# Patient Record
Sex: FEMALE | Race: WHITE | Employment: STUDENT | URBAN - METROPOLITAN AREA
[De-identification: names, ages, dates, MRNs, and addresses within clinical notes are randomized per-mention and may not be internally consistent; named-entity substitution may affect disease eponyms.]

---

## 2021-08-09 ENCOUNTER — HOSPITAL ENCOUNTER (OUTPATIENT)
Dept: PHYSICAL THERAPY | Age: 31
Discharge: HOME | End: 2021-08-09

## 2021-08-09 NOTE — PROGRESS NOTES
PT INITIAL EVALUATION NOTE 2-15    Patient Name: Violeta Larry  Date:2021  : 1990  [x]  Patient  Verified  Payor: BLUE CROSS / Plan: [facility] PPO / Product Type: PPO /    In time:***  Out time:***  Total Treatment Time (min): ***  Visit #: ***     Treatment Area: Pain in right hip [M25.551]    SUBJECTIVE  Pain Level (0-10 scale): ***  Any medication changes, allergies to medications, adverse drug reactions, diagnosis change, or new procedure performed?: [] No    [x] Yes (see summary sheet for update)  Subjective:     ***  PLOF: ***  Mechanism of Injury: ***  Previous Treatment/Compliance: ***  PMHx/Surgical Hx: ***  Work Hx: ***  Living Situation: ***  Pt Goals: ***  Barriers: ***  Motivation: ***  Substance use: ***   Cognition: A & O x ***        OBJECTIVE/EXAMINATION  ***    Modality rationale: {BSHSI INMOTION MODALITIES:62609} to improve the patients ability to ***   Min Type Additional Details    [] Estim: []Att   []Unatt        []TENS instruct                  []IFC  []Premod   []NMES                     []Other:  []w/US   []w/ice   []w/heat  Position:  Location:    []  Traction: [] Cervical       []Lumbar                       [] Prone          []Supine                       []Intermittent   []Continuous Lbs:  [] before manual  [] after manual  []w/heat    []  Ultrasound: []Continuous   [] Pulsed at:                            []1MHz   []3MHz Location:  W/cm2:    []  Paraffin         Location:  []w/heat    []  Ice     []  Heat  []  Ice massage Position:  Location:    []  Laser  []  Other: Position:  Location:    []  Vasopneumatic Device Pressure:       [] lo [] med [] hi   Temperature:    [x] Skin assessment post-treatment:  [x]intact []redness- no adverse reaction    []redness  adverse reaction:     *** min Therapeutic Exercise:  [] See flow sheet :   Rationale: {BSHSI IMMOTION THER EX:55636} to improve the patients ability to ***    *** min Therapeutic Activity:  []  See flow sheet :   Rationale: {BSHSI IMMOTION THER EX:87541}  to improve the patients ability to ***     *** min Neuromuscular Re-education:  []  See flow sheet :   Rationale: {BSHSI IMMOTION THER EX:68680}  to improve the patients ability to ***    *** min Self Care/Home Management:  []  See flow sheet :   Rationale: {BSHSI IMMOTION THER EX:96657}  to improve the patients ability to ***    *** min Manual Therapy:  ***   Rationale: {BSHSI IMMOTION MANUAL THERAPY:66660}  to improve the patients ability to ***    *** min Gait Training:  ___ feet with ___ device on level surfaces with ___ level of assist   Rationale: {BSHSI IMMOTION THER EX:73402}  to improve the patients ability to ***          With   [] TE   [] TA   [] Neuro   [] SC   [] other: Patient Education: [x] Review HEP    [] Progressed/Changed HEP based on:   [] positioning   [] body mechanics   [] transfers   [] heat/ice application    [] other:        Other Objective/Functional Measures: FOTO Functional Measure: ***/100              ***    Pain Level (0-10 scale) post treatment: ***      ASSESSMENT:      [x]  See Plan of Chela Urbano, PT 8/9/2021

## 2021-08-23 ENCOUNTER — HOSPITAL ENCOUNTER (OUTPATIENT)
Dept: PHYSICAL THERAPY | Age: 31
Discharge: HOME | End: 2021-08-23
Payer: COMMERCIAL

## 2021-08-23 PROCEDURE — 97162 PT EVAL MOD COMPLEX 30 MIN: CPT | Performed by: PHYSICAL THERAPIST

## 2021-08-23 PROCEDURE — 97112 NEUROMUSCULAR REEDUCATION: CPT | Performed by: PHYSICAL THERAPIST

## 2021-08-23 NOTE — PROGRESS NOTES
PT INITIAL EVALUATION NOTE - [facility] 2-15    Patient Name: Susy Sevilla  Date:2021  : 1990  [x]  Patient  Verified  Payor: BLUE CROSS / Plan: Cec[facility] [id number] PPO / Product Type: PPO /    In time: 1:20 PM  Out time: 230 PM  Total Treatment Time (min): 70  Total Timed Codes (min): 15  1:1 Treatment Time ( only): 15   Visit #: 1     Treatment Area: Pain in right hip [M25.551]    SUBJECTIVE  Pain Level (0-10 scale): 6/10 \"I golfed 3 times this week\"   Normally its a -3/10  Any medication changes, allergies to medications, adverse drug reactions, diagnosis change, or new procedure performed?: [] No    [x] Yes (see summary sheet for update)  Subjective:    Pt reports history of scoliosis, CA and celiac disease. Pt has had back pain since [employer]. Pt was seeing a chiropractor for back pain and she recommended PT for her pelvic floor. She had 3 sessions and found out the R side of her pelvic floor was tight. Pain is increased by painting, overhead activities, weight lifting, sitting for a long time >10 minutes  Pt c/o HA pain. Pain is increased with stress and decreased with caffeine. \"I take Ibuprofen for pain as needed\"  \"My feet burn when I run\" Pt runs in [address] sleeps on her side    PLOF: Pt enjoys golfing, she enjoys walking her 2 dogs, painting her house, biking  Mechanism of Injury: chronic condition  Previous Treatment/Compliance: Pt sees the chiropractor ever 3 weeks [facility] [employer]   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 (M)     30 degrees  40 degrees  FA ER R.O.M.  (M)     70 degrees  40 degrees    Sidelying  Adduction Drop Test (M)    +   -  Pelvic [place] Drop Test (PADT) (P)  +   -  Passive Abduction Raise Test (PART) (P)  -   +  Passive IP ER Expansion Test (P)   limited   unlimited  Hruska Adduction Lift Test (M)   L0   L1  Hruska Abduction Lift Test (M)   NT   NT    Supine  Trunk Rotation (M)     75% inches  75% inches  Straight Leg Raise (M)    70 degrees  70 degrees   Apical Expansion (R)     good   limited*  Shoulder Flexion (R)     180 degrees  180 degrees  HG IR (R)      90 degrees  90 degrees  Subclavius Flexibility (R)    unlimited  unlimited  Elevated and Externally Rotated Ant.  Ribs (R) yes   no  Horizontal Abduction (R)    60 degrees  50 degrees  Extension Drop Test (M)    -   -    CERVICAL-CRANIO-MANDIBULAR  Cervical Axial Rotation    WNL degrees  WNL degrees  Cervical Side Bend     WNL   WNL  Cervical Extension     WNL        Modality rationale: decrease pain and increase tissue extensibility to improve the patients ability to sit, stand, transfer, ambulate, lift, carry, reach, complete ADLs   Min Type Additional Details    [] Estim: []Att   []Unatt        []TENS instruct                  []IFC  []Premod   []NMES                     []Other:  []w/US   []w/ice   []w/heat  Position:  Location:    []  Traction: [] Cervical       []Lumbar                       [] Prone          []Supine                       []Intermittent   []Continuous Lbs:  [] before manual  [] after manual  []w/heat    []  Ultrasound: []Continuous   [] Pulsed at:                           []1MHz   []3MHz Location:  W/cm2:    [] Paraffin         Location:   []w/heat   15 []  Ice     [x]  Heat  []  Ice massage Position: supine  Location: neck and back    []  Laser  []  Other: Position:  Location:      []  Vasopneumatic Device Pressure:       [] lo [] med [] hi   Temperature:      [x] Skin assessment post-treatment:  [x]intact []redness- no adverse reaction    []redness - adverse reaction:     15 min Neuromuscular Re-education:  [x]  See flow sheet :   Rationale: improve coordination, improve balance and increase proprioception  to improve the patients ability to sit, stand, transfer, ambulate, lift, carry, reach, complete ADLs    5 min Self Care/Home Management:  [x]  See flow sheet :   Rationale: improve coordination, improve balance and increase proprioception  to improve the patients ability to sit, stand, transfer, ambulate, lift, carry, reach, complete ADLs         With   [] TE   [] TA   [x] Neuro   [] SC   [] other: Patient Education: [x] Review HEP    [] Progressed/Changed HEP based on:   [x] positioning   [x] body mechanics   [] transfers   [x] heat/ice application    [] other:      Other Objective/Functional Measures: FOTO Functional Measure: 83/100              slight  Unable to lift L knee for S/L knee tow knee so S/L R glute max performed instead with max difficulty  After intervention HAdDT - B, PADT +L (use pelvis paradigm)        Pain Level (0-10 scale) post treatment: 0    ASSESSMENT/Changes in Function:     [x]  See Plan of [address] [place] [address], PT 8/23/2021

## 2021-08-23 NOTE — PROGRESS NOTES
Physical Therapy at [facility],   a part of  [facility]  [address] [facility]  Phone: [phone]  Fax: [phone]    Plan of Care/ Statement of Necessity for Physical Therapy Services 2-15    Patient name: Roxanne Tobias  : 1990  Provider#: [id number]  Referral source: Javid Quinteros*      Medical/Treatment Diagnosis: Pain in right hip [M25.551]     Prior Hospitalization: see medical history     Comorbidities: ALL (Leukemia) at 12 yo, Scoliosis, alcohol use, Kidney stones, Ovarian Torsion 2020, Celiac Disease, C- DIFF  Prior Level of Function: Pt enjoys golfing, she enjoys walking her 2 dogs, painting her house, biking  Medications: Verified on Patient Summary List    Start of Care: 23      Onset Date: >10 years       The Plan of Care and following information is based on the information from the initial evaluation. Assessment/ key information: The patient presents with patho L AIC, R BC patterning per [facility] treatment approach, contributing to back pain and HA which has been present for > 10 years. The patient's complex PMH noted above contributes to dysfunction. The patient would benefit from outpatient PT to address L anteriorly rotated innominate, limited trunk rotation, limited lumbar flexion, restricted SLR B, poor L pelvic floor ascension, poor R pelvic floor decension and improve breathing mechanics to reduce compensatory muscle recruitment and reduced pain. Evaluation Complexity History HIGH Complexity :3+ comorbidities / personal factors will impact the outcome/ POC ; Examination HIGH Complexity : 4+ Standardized tests and measures addressing body structure, function, activity limitation and / or participation in recreation  ;Presentation HIGH Complexity : Unstable and unpredictable characteristics  ; Clinical Decision Making MEDIUM Complexity : FOTO score of 26-74  Overall Complexity Rating: MEDIUM    Problem List: pain affecting function, decrease ROM, decrease strength, edema affecting function, impaired gait/ balance, decrease ADL/ functional abilitiies, decrease activity tolerance, decrease flexibility/ joint mobility and decrease transfer abilities   Treatment Plan may include any combination of the following: Therapeutic exercise, Therapeutic activities, Neuromuscular re-education, Physical agent/modality, Gait/balance training, Manual therapy, Patient education, Self Care training, Functional mobility training, Home safety training and Stair training  Patient / Family readiness to learn indicated by: asking questions, trying to perform skills and interest  Persons(s) to be included in education: patient (P)  Barriers to Learning/Limitations: None  Patient Goal (s): Pain free/ no headaches  Patient Self Reported Health Status: good  Rehabilitation Potential: excellent    Short Term Goals: To be accomplished in 4 weeks:  1) The patient will demonstrate independence with HEP  2) The patient will demonstrate Negative Hruska Adduction Drop Test   3) The patient will demonstrate greater than Grade II on Hruska Adduction Lift Test  Long Term Goals: To be accomplished in 12 weeks:  1) The patient will demonstrate Grade IV or Grade V Hruska Adduction Lift Score to allow for functional mobility in home and community settings  2) The patient will demonstrate the ability to ambulate forward and backward achieving bilateral Acetabular Femoral Internal Rotation for pain free mobility  3) The patient will demonstrate the ability to jog 1 mile without subjective complaints or gait deviation  4) The patient will demonstrate independence with discharge HEP to allow for ambulation, sitting and standing without objective dysfunction  Frequency / Duration: Patient to be seen 1 times per week for 12 weeks.     Patient/ Caregiver education and instruction: self care, activity modification and exercises    [x]  Plan of care has been reviewed with NAVYA Tao, PT 8/23/2021     ________________________________________________________________________    I certify that the above Therapy Services are being furnished while the patient is under my care. I agree with the treatment plan and certify that this therapy is necessary.     Physician's Signature:____________________  Date:____________Time: _________      SHAI Pelletier

## 2021-09-01 ENCOUNTER — APPOINTMENT (OUTPATIENT)
Dept: PHYSICAL THERAPY | Age: 31
End: 2021-09-01
Payer: COMMERCIAL

## 2021-09-01 ENCOUNTER — HOSPITAL ENCOUNTER (OUTPATIENT)
Dept: PHYSICAL THERAPY | Age: 31
Discharge: HOME | End: 2021-09-01
Payer: COMMERCIAL

## 2021-09-01 PROCEDURE — 97112 NEUROMUSCULAR REEDUCATION: CPT | Performed by: PHYSICAL MEDICINE & REHABILITATION

## 2021-09-01 NOTE — PROGRESS NOTES
PT DAILY TREATMENT NOTE - [facility] 2-15    Patient Name: Ledy Bernal  Date:2021  : 1990  [x]  Patient  Verified  Payor: BLUE CROSS / Plan: [facility] PPO / Product Type: PPO /    In time:730a  Out time:815a  Total Treatment Time (min): 45  Total Timed Codes (min): 45  1:1 Treatment Time ( only): 39   Visit #: 2      Treatment Area: Pain in right hip [M25.551]    SUBJECTIVE  Pain Level (0-10 scale): 3/10  Any medication changes, allergies to medications, adverse drug reactions, diagnosis change, or new procedure performed?: [x] No    [] Yes (see summary sheet for update)  Subjective functional status/changes:   [] No changes reported  Patient reported most of the pain today is in the low back. Patient stated very little HA this morning. OBJECTIVE       45 min Neuromuscular Re-education:  [x]  See flow sheet :   Rationale: increase ROM, increase strength, improve coordination, improve balance and increase proprioception  to improve the patients ability to ADLs, standing, work and walking tolerance          With   [x] TE   [] TA   [] neuro   [] other: Patient Education: [x] Review HEP    [] Progressed/Changed HEP based on:   [] positioning   [] body mechanics   [] transfers   [] heat/ice application    [] other:      Other Objective/Functional Measures:   HAdDT: + B  PADT: + B  HGIR:- B  Horizontal Abd: NT  HAdLT: 1/5 pre 3/5 post  Functional Squat: NT    Neutral following 90/90 martin  Positive following L SL knee toward knee (able to lift knee this time)  Poor ZOA and post mediastinum expansion.         Pain Level (0-10 scale) post treatment: 0/10    ASSESSMENT/Changes in Function:     Patient will continue to benefit from skilled PT services to modify and progress therapeutic interventions, address functional mobility deficits, address ROM deficits, address strength deficits, analyze and address soft tissue restrictions, analyze and cue movement patterns, analyze and modify body mechanics/ergonomics and assess and modify postural abnormalities to attain remaining goals. []  See Plan of Care  []  See progress note/recertification  []  See Discharge Summary         Progress towards goals / Updated goals:  Patient is progressing well towards goals with good tolerance of today's intervention.      PLAN  [x]  Upgrade activities as tolerated     [x]  Continue plan of care  [x]  Update interventions per flow sheet       []  Discharge due to:_  []  Other:_      Cassia House PTA, OPTA, CPT  9/1/2021

## 2021-09-07 ENCOUNTER — HOSPITAL ENCOUNTER (OUTPATIENT)
Dept: PHYSICAL THERAPY | Age: 31
Discharge: HOME | End: 2021-09-07
Payer: COMMERCIAL

## 2021-09-07 PROCEDURE — 97112 NEUROMUSCULAR REEDUCATION: CPT | Performed by: PHYSICAL MEDICINE & REHABILITATION

## 2021-09-07 NOTE — PROGRESS NOTES
PT DAILY TREATMENT NOTE - [facility] 2-15    Patient Name: Ledy Bernal  Date:2021  : 1990  [x]  Patient  Verified  Payor: BLUE CROSS / Plan: [facility] PPO / Product Type: PPO /    In time:850a  Out time:935a  Total Treatment Time (min): 45  Total Timed Codes (min): 45  1:1 Treatment Time ( only): 39   Visit #: 3      Treatment Area: Pain in right hip [M25.551]    SUBJECTIVE  Pain Level (0-10 scale): 0/10  Any medication changes, allergies to medications, adverse drug reactions, diagnosis change, or new procedure performed?: [x] No    [] Yes (see summary sheet for update)  Subjective functional status/changes:   [] No changes reported  Patient reported being inconsistent with her HEP due to the holiday weekend. Patient stated the hip and back are feeling better and the HA are the same. OBJECTIVE  45 min Neuromuscular Re-education:  [x]  See flow sheet :   Rationale: increase ROM, increase strength, improve coordination, improve balance and increase proprioception  to improve the patients ability to ADLs, standing, work and walking tolerance          With   [x] TE   [] TA   [] neuro   [] other: Patient Education: [x] Review HEP    [] Progressed/Changed HEP based on:   [] positioning   [] body mechanics   [] transfers   [] heat/ice application    [] other:      Other Objective/Functional Measures:   HAdDT: - B  PADT: - B  HGIR:- B  Horizontal Abd: NT  HAdLT: 3/5 Pre  Functional Squat: 2/5    Maintain neutral throughout today's visit. Poor ZOA and post mediastinum expansion.         Pain Level (0-10 scale) post treatment: 0/10    ASSESSMENT/Changes in Function:     Patient will continue to benefit from skilled PT services to modify and progress therapeutic interventions, address functional mobility deficits, address ROM deficits, address strength deficits, analyze and address soft tissue restrictions, analyze and cue movement patterns, analyze and modify body mechanics/ergonomics and assess and modify postural abnormalities to attain remaining goals. []  See Plan of Care  []  See progress note/recertification  []  See Discharge Summary         Progress towards goals / Updated goals:  Patient is progressing well towards goals with good tolerance of today's intervention.      PLAN  [x]  Upgrade activities as tolerated     [x]  Continue plan of care  [x]  Update interventions per flow sheet       []  Discharge due to:_  []  Other:_      Judie Barrientos PTA, OPTA, CPT  9/7/2021

## 2021-09-08 ENCOUNTER — APPOINTMENT (OUTPATIENT)
Dept: PHYSICAL THERAPY | Age: 31
End: 2021-09-08
Payer: COMMERCIAL

## 2021-09-14 ENCOUNTER — HOSPITAL ENCOUNTER (OUTPATIENT)
Dept: PHYSICAL THERAPY | Age: 31
Discharge: HOME | End: 2021-09-14
Payer: COMMERCIAL

## 2021-09-14 PROCEDURE — 97112 NEUROMUSCULAR REEDUCATION: CPT | Performed by: PHYSICAL THERAPIST

## 2021-09-14 NOTE — PROGRESS NOTES
PT DAILY TREATMENT NOTE - [facility] 2-15    Patient Name: Dorenda Severs  Date:2021  : 1990  [x]  Patient  Verified  Payor: BLUE CROSS / Plan: Cec[facility] [id number] PPO / Product Type: PPO /    In time: 4:20 PM  Out time:500  Total Treatment Time (min): 40  Total Timed Codes (min): 40  1:1 Treatment Time ( only): 40  Visit #: 4      Treatment Area: Pain in right hip [M25.551]    SUBJECTIVE  Pain Level (0-10 scale): 0/10  Any medication changes, allergies to medications, adverse drug reactions, diagnosis change, or new procedure performed?: [x] No    [] Yes (see summary sheet for update)  Subjective functional status/changes:   [] No changes reported  Patient reports her neck is much better and more mobile but her headaches are worse  Pt reports she got new shoes for running and she is trying to wear tennis shoes more often   Pt reports she felt better running  Pt reports compliance with supine amrtin-bridge but none of the other exercises    OBJECTIVE  40 min Neuromuscular Re-education:  [x]  See flow sheet :   Rationale: increase ROM, increase strength, improve coordination, improve balance and increase proprioception  to improve the patients ability to ADLs, standing, work and walking tolerance          With   [x] TE   [] TA   [] neuro   [] other: Patient Education: [x] Review HEP    [] Progressed/Changed HEP based on:   [] positioning   [] body mechanics   [] transfers   [] heat/ice application    [] other:      Other Objective/Functional Measures:   HAdDT: - B  PADT: - B  HGIR:- B   Horizontal Abd: R 45 L 30  HAdLT: NT  Functional Squat: NT    Pt senses L canine when approximating teeth, discussed use of night splint for reducing headaches       Pain Level (0-10 scale) post treatment: 0/10    ASSESSMENT/Changes in Function:     Patient will continue to benefit from skilled PT services to modify and progress therapeutic interventions, address functional mobility deficits, address ROM deficits, address strength deficits, analyze and address soft tissue restrictions, analyze and cue movement patterns, analyze and modify body mechanics/ergonomics and assess and modify postural abnormalities to attain remaining goals. []  See Plan of Care  []  See progress note/recertification  []  See Discharge Summary         Progress towards goals / Updated goals:  Excellent progress towards goals.     PLAN  [x]  Upgrade activities as tolerated     [x]  Continue plan of care  [x]  Update interventions per flow sheet       []  Discharge due to:_  []  Other:_      Faith Wilkerson, PT  DPT 9/14/2021

## 2021-09-20 ENCOUNTER — APPOINTMENT (OUTPATIENT)
Dept: PHYSICAL THERAPY | Age: 31
End: 2021-09-20
Payer: COMMERCIAL

## 2021-09-22 ENCOUNTER — HOSPITAL ENCOUNTER (OUTPATIENT)
Dept: PHYSICAL THERAPY | Age: 31
Discharge: HOME | End: 2021-09-22
Payer: COMMERCIAL

## 2021-09-22 PROCEDURE — 97112 NEUROMUSCULAR REEDUCATION: CPT | Performed by: PHYSICAL MEDICINE & REHABILITATION

## 2021-09-22 NOTE — PROGRESS NOTES
PT DAILY TREATMENT NOTE - [facility] 2-15    Patient Name: Briana Villafana  Date:2021  : 1990  [x]  Patient  Verified  Payor: BLUE ARNALDO / Plan: Cecily [facility] [id number] PPO / Product Type: PPO /    In time: 700 AM  Out time:745 AM  Total Treatment Time (min): 45  Total Timed Codes (min): 45  1:1 Treatment Time ( only): 45  Visit #: 5      Treatment Area: Pain in right hip [M25.551]    SUBJECTIVE  Pain Level (0-10 scale): 0/10  Any medication changes, allergies to medications, adverse drug reactions, diagnosis change, or new procedure performed?: [x] No    [] Yes (see summary sheet for update)  Subjective functional status/changes:   [] No changes reported  Patient reported she did her HEP everyday since she was on vacation. OBJECTIVE  45 min Neuromuscular Re-education:  [x]  See flow sheet :   Rationale: increase ROM, increase strength, improve coordination, improve balance and increase proprioception  to improve the patients ability to ADLs, standing, work and walking tolerance          With   [x] TE   [] TA   [] neuro   [] other: Patient Education: [x] Review HEP    [] Progressed/Changed HEP based on:   [] positioning   [] body mechanics   [] transfers   [] heat/ice application    [] other:      Other Objective/Functional Measures:   HAdDT: - B  PADT: - B  HGIR:- B   Horizontal Abd: R 50 L 50  HAdLT: L 3/5 R 1/5 all 3/5 post  Functional Squat: 2/5 pre 2+/5 post       Pain Level (0-10 scale) post treatment: 0/10    ASSESSMENT/Changes in Function:     Patient will continue to benefit from skilled PT services to modify and progress therapeutic interventions, address functional mobility deficits, address ROM deficits, address strength deficits, analyze and address soft tissue restrictions, analyze and cue movement patterns, analyze and modify body mechanics/ergonomics and assess and modify postural abnormalities to attain remaining goals.      []  See Plan of Care  []  See progress note/recertification  []  See Discharge Summary         Progress towards goals / Updated goals:  Progress note next visit.     PLAN  [x]  Upgrade activities as tolerated     [x]  Continue plan of care  [x]  Update interventions per flow sheet       []  Discharge due to:_  []  Other:_      Génesis Courts  PTA, OPTA, CPT  9/22/2021

## 2021-09-29 ENCOUNTER — APPOINTMENT (OUTPATIENT)
Dept: PHYSICAL THERAPY | Age: 31
End: 2021-09-29
Payer: COMMERCIAL

## 2021-10-06 ENCOUNTER — HOSPITAL ENCOUNTER (OUTPATIENT)
Dept: PHYSICAL THERAPY | Age: 31
Discharge: HOME | End: 2021-10-06
Payer: COMMERCIAL

## 2021-10-06 PROCEDURE — 97112 NEUROMUSCULAR REEDUCATION: CPT | Performed by: PHYSICAL THERAPIST

## 2021-10-06 NOTE — PROGRESS NOTES
PT DAILY TREATMENT NOTE 2-15    Patient Name: Ravi Reyna  Date:10/6/2021  : 1990  [x]  Patient  Verified  Payor: BLUE CROSS / Plan: [facility] PPO / Product Type: PPO /    In time:7:10AM  Out time:7:50AM  Total Treatment Time (min): 40  Visit #: 6    Treatment Area: Pain in right hip [M25.551]    SUBJECTIVE  Pain Level (0-10 scale): 0.5/10  Any medication changes, allergies to medications, adverse drug reactions, diagnosis change, or new procedure performed?: [x] No    [] Yes (see summary sheet for update)  Subjective functional status/changes:   [x] No changes reported  Pt reports poor compliance with HEP due to busy schedule    OBJECTIVE      40 min Neuromuscular Re-education:  [x]  See flow sheet :   Rationale: improve coordination, improve balance and increase proprioception  to improve the patients ability to sit, stand, transfer, ambulate, lift, carry, reach, complete ADLs        With   [] TE   [] TA   [x] Neuro   [] SC   [] other: Patient Education: [x] Review HEP    [] Progressed/Changed HEP based on:   [x] positioning   [x] body mechanics   [] transfers   [x] heat/ice application    [] other:      Other Objective/Functional Measures:   HAdDT - B  Lift test R L3 L L4 (heel off table in start position, R worse than L)    HG IR + R (- after intervention)  horiz abd WNL     Limited FAER    Pain Level (0-10 scale) post treatment: 1/10    ASSESSMENT/Changes in Function:      Patient will continue to benefit from skilled PT services to modify and progress therapeutic interventions, address functional mobility deficits, address ROM deficits, address strength deficits, analyze and address soft tissue restrictions, analyze and cue movement patterns, analyze and modify body mechanics/ergonomics, assess and modify postural abnormalities and instruct in home and community integration to attain remaining goals.      []  See Plan of Care  []  See progress note/recertification  []  See Discharge Summary         Progress towards goals / Updated goals: Addressed limited R FA ER with stretching and retro stairs for improved ability to maintain neutral pelvis.      PLAN  [x]  Upgrade activities as tolerated     [x]  Continue plan of care  [x]  Update interventions per flow sheet       []  Discharge due to:_  []  Other:_      Vivi Basurto, PT 10/6/2021

## 2021-10-13 ENCOUNTER — HOSPITAL ENCOUNTER (OUTPATIENT)
Dept: PHYSICAL THERAPY | Age: 31
Discharge: HOME | End: 2021-10-13
Payer: COMMERCIAL

## 2021-10-13 PROCEDURE — 97112 NEUROMUSCULAR REEDUCATION: CPT | Performed by: PHYSICAL THERAPIST

## 2021-10-13 NOTE — PROGRESS NOTES
PT DAILY TREATMENT NOTE 2-15    Patient Name: Quirino Arreaga  Date:10/13/2021  : 1990  [x]  Patient  Verified  Payor: BLUE CROSS / Plan: [id number] PPO / Product Type: PPO /    In time:7:00 AM  Out time: 745 Am   Total Treatment Time (min): 45  Visit #: 7    Treatment Area: Pain in right hip [M25.551]    SUBJECTIVE  Pain Level (0-10 scale): 0/10  Any medication changes, allergies to medications, adverse drug reactions, diagnosis change, or new procedure performed?: [x] No    [] Yes (see summary sheet for update)  Subjective functional status/changes:   [x] No changes reported  Pt reports she had her best run yet last week     OBJECTIVE      45 min Neuromuscular Re-education:  [x]  See flow sheet :   Rationale: improve coordination, improve balance and increase proprioception  to improve the patients ability to sit, stand, transfer, ambulate, lift, carry, reach, complete ADLs        With   [] TE   [] TA   [x] Neuro   [] SC   [] other: Patient Education: [x] Review HEP    [] Progressed/Changed HEP based on:   [x] positioning   [x] body mechanics   [] transfers   [x] heat/ice application    [] other:      Other Objective/Functional Measures:   HAdDT - B  Lift test R L0 L L3 (R L2 after intervention)    HG IR - B  horiz abd 20 deg B (50 deg after 90/90 IO/TA exercise)     L 2 squat (L3 squat after retro stairs)    Pain Level (0-10 scale) post treatment: 0/10    ASSESSMENT/Changes in Function:      Patient will continue to benefit from skilled PT services to modify and progress therapeutic interventions, address functional mobility deficits, address ROM deficits, address strength deficits, analyze and address soft tissue restrictions, analyze and cue movement patterns, analyze and modify body mechanics/ergonomics, assess and modify postural abnormalities and instruct in home and community integration to attain remaining goals.      []  See Plan of Care  []  See progress note/recertification  [] See Discharge Summary         Progress towards goals / Updated goals: Max fatigue with L quad during 90/90 IO/TA. Added seated balloon breathing without significant compensation. Max fatigue with squats.     PLAN  [x]  Upgrade activities as tolerated     [x]  Continue plan of care  [x]  Update interventions per flow sheet       []  Discharge due to:_  []  Other:_      Benita Andersen, PT 10/13/2021

## 2021-10-20 ENCOUNTER — HOSPITAL ENCOUNTER (OUTPATIENT)
Dept: PHYSICAL THERAPY | Age: 31
Discharge: HOME | End: 2021-10-20
Payer: COMMERCIAL

## 2021-10-20 PROCEDURE — 97112 NEUROMUSCULAR REEDUCATION: CPT | Performed by: PHYSICAL MEDICINE & REHABILITATION

## 2021-10-20 NOTE — PROGRESS NOTES
PT DAILY TREATMENT NOTE 2-15    Patient Name: Luis Manuel Tarango  Date:10/20/2021  : 1990  [x]  Patient  Verified  Payor: BLUE CROSS / Plan: [facility] PPO / Product Type: PPO /    In time:7:00 AM  Out time: 740 am   Total Treatment Time (min): 40  Visit #: 8    Treatment Area: Pain in right hip [M25.551]    SUBJECTIVE  Pain Level (0-10 scale): 0/10  Any medication changes, allergies to medications, adverse drug reactions, diagnosis change, or new procedure performed?: [x] No    [] Yes (see summary sheet for update)  Subjective functional status/changes:   [x] No changes reported  Pt reports she overall has been feeling good. Mod difficulty with HEP. OBJECTIVE    40 min Neuromuscular Re-education:  [x]  See flow sheet :   Rationale: improve coordination, improve balance and increase proprioception  to improve the patients ability to sit, stand, transfer, ambulate, lift, carry, reach, complete ADLs        With   [] TE   [] TA   [x] Neuro   [] SC   [] other: Patient Education: [x] Review HEP    [] Progressed/Changed HEP based on:   [x] positioning   [x] body mechanics   [] transfers   [x] heat/ice application    [] other:      Other Objective/Functional Measures:   HAdDT - B  Lift test Pre R L3 L L3 Post Still level 3 but easier to achieve. HG IR - B  horiz abd 45 deg L 60 on R (50 deg after 90/90 IO/TA exercise)     L 3 squat  Continued difficulty with maintaining pelvic tilt while squatting. Mod to max difficulty with standing sup B IO/TA.     Pain Level (0-10 scale) post treatment: 0/10    ASSESSMENT/Changes in Function:      Patient will continue to benefit from skilled PT services to modify and progress therapeutic interventions, address functional mobility deficits, address ROM deficits, address strength deficits, analyze and address soft tissue restrictions, analyze and cue movement patterns, analyze and modify body mechanics/ergonomics, assess and modify postural abnormalities and instruct in home and community integration to attain remaining goals. []  See Plan of Care  []  See progress note/recertification  []  See Discharge Summary         Progress towards goals / Updated goals: Max fatigue with L quad during 90/90 IO/TA. Max fatigue with squats.     PLAN  [x]  Upgrade activities as tolerated     [x]  Continue plan of care  [x]  Update interventions per flow sheet       []  Discharge due to:_  []  Other:_      Zeferino Case PTA, OPTA, CPT  10/20/2021

## 2021-10-26 ENCOUNTER — APPOINTMENT (OUTPATIENT)
Dept: PHYSICAL THERAPY | Age: 31
End: 2021-10-26
Payer: COMMERCIAL

## 2021-10-27 ENCOUNTER — APPOINTMENT (OUTPATIENT)
Dept: PHYSICAL THERAPY | Age: 31
End: 2021-10-27
Payer: COMMERCIAL

## 2021-11-03 ENCOUNTER — HOSPITAL ENCOUNTER (OUTPATIENT)
Dept: PHYSICAL THERAPY | Age: 31
Discharge: HOME | End: 2021-11-03
Payer: COMMERCIAL

## 2021-11-03 PROCEDURE — 97112 NEUROMUSCULAR REEDUCATION: CPT | Performed by: PHYSICAL THERAPIST

## 2021-11-03 NOTE — PROGRESS NOTES
PT DAILY TREATMENT NOTE 2-15    Patient Name: Elliot Ramos  Date:11/3/2021  : 1990  [x]  Patient  Verified  Payor: BLUE CROSS / Plan: [facility] PPO / Product Type: PPO /    In time: 215 PM Out time: 300 PM  Total Treatment Time (min): 45  Visit #: 9    Treatment Area: Pain in right hip [M25.551]    SUBJECTIVE  Pain Level (0-10 scale): 0/10  Any medication changes, allergies to medications, adverse drug reactions, diagnosis change, or new procedure performed?: [x] No    [] Yes (see summary sheet for update)  Subjective functional status/changes:   [x] No changes reported  Pt reports poor compliance with HEP and presents with flip flops donned  Pt c/o intermittent R ITB pain    OBJECTIVE    45 min Neuromuscular Re-education:  [x]  See flow sheet :   Rationale: improve coordination, improve balance and increase proprioception  to improve the patients ability to sit, stand, transfer, ambulate, lift, carry, reach, complete ADLs        With   [] TE   [] TA   [x] Neuro   [] SC   [] other: Patient Education: [x] Review HEP    [] Progressed/Changed HEP based on:   [x] positioning   [x] body mechanics   [] transfers   [x] heat/ice application    [] other:      Other Objective/Functional Measures:   HAdDT - B  Lift test Pre R L3 L L3 Post Still level 3 but improved adductor activation    HG IR - B  horiz abd 50 deg B      Squat NT, max verbal cues to ascend out of squatting bar reach with proper form  Mod to max difficulty with supine sup B IO/TA.     Pain Level (0-10 scale) post treatment: 0/10    ASSESSMENT/Changes in Function:      Patient will continue to benefit from skilled PT services to modify and progress therapeutic interventions, address functional mobility deficits, address ROM deficits, address strength deficits, analyze and address soft tissue restrictions, analyze and cue movement patterns, analyze and modify body mechanics/ergonomics, assess and modify postural abnormalities and instruct in home and community integration to attain remaining goals.      []  See Plan of Care  []  See progress note/recertification  []  See Discharge Summary         Progress towards goals / Updated goals:  Limited R apical expansion addressed with stand sup passive L AFIR with RTR (improved lift performance after this activity)    PLAN  [x]  Upgrade activities as tolerated     [x]  Continue plan of care  [x]  Update interventions per flow sheet       []  Discharge due to:_  []  Other:_      Kevin Albrecht, PT DPT 11/3/2021

## 2021-11-05 ENCOUNTER — APPOINTMENT (OUTPATIENT)
Dept: PHYSICAL THERAPY | Age: 31
End: 2021-11-05
Payer: COMMERCIAL

## 2021-11-12 ENCOUNTER — HOSPITAL ENCOUNTER (OUTPATIENT)
Dept: PHYSICAL THERAPY | Age: 31
Discharge: HOME | End: 2021-11-12
Payer: COMMERCIAL

## 2021-11-12 PROCEDURE — 97112 NEUROMUSCULAR REEDUCATION: CPT | Performed by: PHYSICAL THERAPIST

## 2021-11-12 NOTE — PROGRESS NOTES
PT DAILY TREATMENT NOTE 2-15    Patient Name: Gurdeep Lu  Date:2021  : 1990  [x]  Patient  Verified  Payor: BLUE CROSS / Plan: [id number] PPO / Product Type: PPO /    In time: 750 AM Out time: 830 AM  Total Treatment Time (min): 40  Visit #: 10    Treatment Area: Pain in right hip [M25.551]    SUBJECTIVE  Pain Level (0-10 scale): 0/10  Any medication changes, allergies to medications, adverse drug reactions, diagnosis change, or new procedure performed?: [x] No    [] Yes (see summary sheet for update)  Subjective functional status/changes:   [x] No changes reported  Pt reports she has been working on the squatting bar reach and she has some L ankle pain    OBJECTIVE    40 min Neuromuscular Re-education:  [x]  See flow sheet :   Rationale: improve coordination, improve balance and increase proprioception  to improve the patients ability to sit, stand, transfer, ambulate, lift, carry, reach, complete ADLs        With   [] TE   [] TA   [x] Neuro   [] SC   [] other: Patient Education: [x] Review HEP    [] Progressed/Changed HEP based on:   [x] positioning   [x] body mechanics   [] transfers   [x] heat/ice application    [] other:      Other Objective/Functional Measures:   HAdDT - B  Lift test Pre R L3 L L5  L L4 after Standing Supported Left AF IR with Right FA Abduction    Pain Level (0-10 scale) post treatment: 0/10    ASSESSMENT/Changes in Function:      Patient will continue to benefit from skilled PT services to modify and progress therapeutic interventions, address functional mobility deficits, address ROM deficits, address strength deficits, analyze and address soft tissue restrictions, analyze and cue movement patterns, analyze and modify body mechanics/ergonomics, assess and modify postural abnormalities and instruct in home and community integration to attain remaining goals.      []  See Plan of Care  []  See progress note/recertification  []  See Discharge Summary Progress towards goals / Updated goals:  Improved lift performance this visit.     PLAN  [x]  Upgrade activities as tolerated     [x]  Continue plan of care  [x]  Update interventions per flow sheet       []  Discharge due to:_  []  Other:_      Jeff Eckert, PT DPT 11/12/2021

## 2021-11-30 ENCOUNTER — HOSPITAL ENCOUNTER (OUTPATIENT)
Dept: PHYSICAL THERAPY | Age: 31
Discharge: HOME | End: 2021-11-30
Payer: COMMERCIAL

## 2021-11-30 NOTE — PROGRESS NOTES
PT DAILY TREATMENT NOTE 2-15    Patient Name: Quirino Arreaga  Date:2021  : 1990  [x]  Patient  Verified  Payor: BLUE CROSS / Plan: [facility] PPO / Product Type: PPO /    In time: 505 PM Out time: 550 PM  Total Treatment Time (min): 45  Visit #: 11    Treatment Area: Pain in right hip [M25.551]    SUBJECTIVE  Pain Level (0-10 scale): 0/10  Any medication changes, allergies to medications, adverse drug reactions, diagnosis change, or new procedure performed?: [x] No    [] Yes (see summary sheet for update)  Subjective functional status/changes:   [x] No changes reported  Pt reports she has not been compliant with HEP. \"I feel good but I have trouble sitting all day at work\"    OBJECTIVE    45 min Neuromuscular Re-education:  [x]  See flow sheet :   Rationale: improve coordination, improve balance and increase proprioception  to improve the patients ability to sit, stand, transfer, ambulate, lift, carry, reach, complete ADLs        With   [] TE   [] TA   [x] Neuro   [] SC   [] other: Patient Education: [x] Review HEP    [] Progressed/Changed HEP based on:   [x] positioning   [x] body mechanics   [] transfers   [x] heat/ice application    [] other:      Other Objective/Functional Measures:   HAdDT - B  Lift test Pre L4 B  Squat L3    Pain Level (0-10 scale) post treatment: 0/10    ASSESSMENT/Changes in Function:      Patient will continue to benefit from skilled PT services to modify and progress therapeutic interventions, address functional mobility deficits, address ROM deficits, address strength deficits, analyze and address soft tissue restrictions, analyze and cue movement patterns, analyze and modify body mechanics/ergonomics, assess and modify postural abnormalities and instruct in home and community integration to attain remaining goals.      []  See Plan of Care  [x]  See progress note/recertification  []  See Discharge Summary         Progress towards goals / Updated goals:  Improved lift performance this visit, discussed importance of continuing HEP. Discussing proper sitting positions. See prog note.     PLAN  [x]  Upgrade activities as tolerated     [x]  Continue plan of care  [x]  Update interventions per flow sheet       []  Discharge due to:_  []  Other:_      Jad Hamilton, PT DPT 11/30/2021

## 2021-11-30 NOTE — PROGRESS NOTES
Physical Therapy at [facility],   a part of [facility]  [place]  [facility] [place]  Phone: [phone]      Fax:  [phone]    Progress Note and Updated POC    Name: Tena Rucker   : 1990   MD: Erlin Reyes*       Treatment Diagnosis: Pain in right hip [M25.551]  Start of Care: 21    Visits from Start of Care: 10  Missed Visits: 0    Summary of Care: [facility] intervention including neuromuscular re-education, home exercise program, patient education to address patho L AIC, R BC patterning. Assessment / Recommendations: The patient has completed 10 visits and has made significant gains in pelvic alignment and muscular coordination. She has met all short term goals and 2/4 long term goals. She recently was able to run an 8K race without an increase in pain. At this point, her pain levels are low but she would like to work on her sitting tolerance at work. The patient would benefit from continued PT every other week for up to 6 additional visits to improve strength and stability for improved sitting tolerance and squatting performance. Short Term Goals: To be accomplished in 4 weeks: ALL MET  1) The patient will demonstrate independence with HEP  2) The patient will demonstrate Negative Hruska Adduction Drop Test   3) The patient will demonstrate greater than Grade II on Hruska Adduction Lift Test  Long Term Goals:  To be accomplished in 12 weeks:  Update to 17 total visits (6 additional visits)  1) The patient will demonstrate Grade IV or Grade V Hruska Adduction Lift Score to allow for functional mobility in home and community settings - MET  2) The patient will demonstrate the ability to ambulate forward and backward achieving bilateral Acetabular Femoral Internal Rotation for pain free mobility - NOT MET  3) The patient will demonstrate the ability to jog 1 mile without subjective complaints or gait deviation - MET  4) The patient will demonstrate independence with discharge HEP to allow for ambulation, sitting and standing without objective dysfunction - NOT MET  NEW GOALS:  5) The patient will sit for 4 hours without an increase in pain  6) The patient will demonstrate L4 squat or greater to indicate improved coordination of respiratory and pelvic diaphragm    Mika Christine, PT 11/30/2021

## 2021-12-15 ENCOUNTER — APPOINTMENT (OUTPATIENT)
Dept: PHYSICAL THERAPY | Age: 31
End: 2021-12-15
Payer: COMMERCIAL

## 2021-12-17 ENCOUNTER — HOSPITAL ENCOUNTER (OUTPATIENT)
Dept: PHYSICAL THERAPY | Age: 31
Discharge: HOME | End: 2021-12-17
Payer: COMMERCIAL

## 2021-12-17 PROCEDURE — 97112 NEUROMUSCULAR REEDUCATION: CPT | Performed by: PHYSICAL MEDICINE & REHABILITATION

## 2021-12-17 NOTE — PROGRESS NOTES
PT DAILY TREATMENT NOTE 2-15    Patient Name: Bridget Cordero  Date:2021  : 1990  [x]  Patient  Verified  Payor: BLUE CROSS / Plan: [facility] PPO / Product Type: PPO /    In time: 1100 AM Out time: 7961 AM  Total Treatment Time (min): 45  Visit #: 12    Treatment Area: Pain in right hip [M25.551]    SUBJECTIVE  Pain Level (0-10 scale): 0/10  Any medication changes, allergies to medications, adverse drug reactions, diagnosis change, or new procedure performed?: [x] No    [] Yes (see summary sheet for update)  Subjective functional status/changes:   [x] No changes reported  Pt reports she has done some of her HEP but not all. Patient stated overall she is feeling really good with no back pain present with activity, some hip spasms that occur randomly with activity. OBJECTIVE    45 min Neuromuscular Re-education:  [x]  See flow sheet :   Rationale: improve coordination, improve balance and increase proprioception  to improve the patients ability to sit, stand, transfer, ambulate, lift, carry, reach, complete ADLs        With   [] TE   [] TA   [x] Neuro   [] SC   [] other: Patient Education: [x] Review HEP    [] Progressed/Changed HEP based on:   [x] positioning   [x] body mechanics   [] transfers   [x] heat/ice application    [] other:      Other Objective/Functional Measures:   HAdDT - B  Lift test Pre L4 B  Squat L3    Pain Level (0-10 scale) post treatment: 0/10    ASSESSMENT/Changes in Function:      Patient will continue to benefit from skilled PT services to modify and progress therapeutic interventions, address functional mobility deficits, address ROM deficits, address strength deficits, analyze and address soft tissue restrictions, analyze and cue movement patterns, analyze and modify body mechanics/ergonomics, assess and modify postural abnormalities and instruct in home and community integration to attain remaining goals.      []  See Plan of Care  []  See progress note/recertification  []  See Discharge Summary         Progress towards goals / Updated goals:  Wrote note for work to purchase a standing desk for working from home. Possible discharge in 2 weeks. Mod fatigue with R glute max exercise.     PLAN  [x]  Upgrade activities as tolerated     [x]  Continue plan of care  [x]  Update interventions per flow sheet       []  Discharge due to:_  []  Other:_      Inocencio Olivo PTA, OPTA, CPT  12/17/2021

## 2021-12-28 ENCOUNTER — APPOINTMENT (OUTPATIENT)
Dept: PHYSICAL THERAPY | Age: 31
End: 2021-12-28
Payer: COMMERCIAL

## 2021-12-30 ENCOUNTER — HOSPITAL ENCOUNTER (OUTPATIENT)
Dept: PHYSICAL THERAPY | Age: 31
Discharge: HOME | End: 2021-12-30
Payer: COMMERCIAL

## 2021-12-30 PROCEDURE — 97112 NEUROMUSCULAR REEDUCATION: CPT | Performed by: PHYSICAL THERAPIST

## 2021-12-30 NOTE — PROGRESS NOTES
PT DAILY TREATMENT NOTE 2-15    Patient Name: Zenaida Foster  Date:2021  : 1990  [x]  Patient  Verified  Payor: BLUE CROSS / Plan: [facility] PPO / Product Type: PPO /    In time: 905 AM Out time: 950 AM  Total Treatment Time (min): 45  Total 1:1 Time: 40   Visit #: 13    Treatment Area: Pain in right hip [M25.551]    SUBJECTIVE  Pain Level (0-10 scale): 0/10  Any medication changes, allergies to medications, adverse drug reactions, diagnosis change, or new procedure performed?: [x] No    [] Yes (see summary sheet for update)  Subjective functional status/changes:   [x] No changes reported  Pt reports she has not been compliant with HEP and has been lifting every day    OBJECTIVE    45 min Neuromuscular Re-education:  [x]  See flow sheet :   Rationale: improve coordination, improve balance and increase proprioception  to improve the patients ability to sit, stand, transfer, ambulate, lift, carry, reach, complete ADLs        With   [] TE   [] TA   [x] Neuro   [] SC   [] other: Patient Education: [x] Review HEP    [] Progressed/Changed HEP based on:   [x] positioning   [x] body mechanics   [] transfers   [x] heat/ice application    [] other:      Other Objective/Functional Measures:   HAdDT - B (R heel whip with L drop test)  Lift test Pre L4 B (R hip pain getting into test position with right hip abducted)  Squat L3  HG IR 90  HG flexion 180  HG horiz abd 60 deg      Pain Level (0-10 scale) post treatment: 0/10    ASSESSMENT/Changes in Function:      Patient will continue to benefit from skilled PT services to modify and progress therapeutic interventions, address functional mobility deficits, address ROM deficits, address strength deficits, analyze and address soft tissue restrictions, analyze and cue movement patterns, analyze and modify body mechanics/ergonomics, assess and modify postural abnormalities and instruct in home and community integration to attain remaining goals.      [] See Plan of Care  []  See progress note/recertification  []  See Discharge Summary         Progress towards goals / Updated goals: Addressed form with lifting routine to decrease right hip tension. Pt encouraged to perform ZAHRAA HEP following lifting at the gym.     PLAN  [x]  Upgrade activities as tolerated     [x]  Continue plan of care  [x]  Update interventions per flow sheet       []  Discharge due to:_  []  Other:_      Jeffrey Smith, PT  DPT 12/30/2021

## 2022-01-26 ENCOUNTER — HOSPITAL ENCOUNTER (OUTPATIENT)
Dept: PHYSICAL THERAPY | Age: 32
Discharge: HOME | End: 2022-01-26
Payer: COMMERCIAL

## 2022-01-26 PROCEDURE — 97112 NEUROMUSCULAR REEDUCATION: CPT | Performed by: PHYSICAL MEDICINE & REHABILITATION

## 2022-01-27 NOTE — PROGRESS NOTES
PT DAILY TREATMENT NOTE 2-15    Patient Name: Wu Randolph  Date:2022  : 1990  [x]  Patient  Verified  Payor: BLUE CROSS / Plan: [facility] PPO / Product Type: PPO /    In time: 315 AM Out time: 400 AM  Total Treatment Time (min): 45  Total 1:1 Time: 45   Visit #: 14    Treatment Area: Pain in right hip [M25.551]    SUBJECTIVE  Pain Level (0-10 scale): 0/10  Any medication changes, allergies to medications, adverse drug reactions, diagnosis change, or new procedure performed?: [x] No    [] Yes (see summary sheet for update)  Subjective functional status/changes:   [x] No changes reported  Pt reports she does her HEP 3x a week. Patient stated the only thing she has a little trouble with is sitting prolong periods for work but has improved. OBJECTIVE    45 min Neuromuscular Re-education:  [x]  See flow sheet :   Rationale: improve coordination, improve balance and increase proprioception  to improve the patients ability to sit, stand, transfer, ambulate, lift, carry, reach, complete ADLs        With   [] TE   [] TA   [x] Neuro   [] SC   [] other: Patient Education: [x] Review HEP    [] Progressed/Changed HEP based on:   [x] positioning   [x] body mechanics   [] transfers   [x] heat/ice application    [] other:      Other Objective/Functional Measures:   HAdDT - B  Lift test Pre L4 B   Squat L3  HG IR 90  HG flexion 180  HG horiz abd 60 deg      Pain Level (0-10 scale) post treatment: 0/10    ASSESSMENT/Changes in Function:      Patient will continue to benefit from skilled PT services to modify and progress therapeutic interventions, address functional mobility deficits, address ROM deficits, address strength deficits, analyze and address soft tissue restrictions, analyze and cue movement patterns, analyze and modify body mechanics/ergonomics, assess and modify postural abnormalities and instruct in home and community integration to attain remaining goals.      []  See Plan of Care  []  See progress note/recertification  []  See Discharge Summary         Progress towards goals / Updated goals: Added in unilateral gym exercises and BOSU exercises for gym routine.  Poor L post capsule inhibition    PLAN  [x]  Upgrade activities as tolerated     [x]  Continue plan of care  [x]  Update interventions per flow sheet       []  Discharge due to:_  []  Other:_      Elliot Anand  PTA, OPTA, CPT  1/27/2022

## 2022-02-28 ENCOUNTER — APPOINTMENT (OUTPATIENT)
Dept: PHYSICAL THERAPY | Age: 32
End: 2022-02-28
Payer: COMMERCIAL

## 2022-03-14 ENCOUNTER — APPOINTMENT (OUTPATIENT)
Dept: PHYSICAL THERAPY | Age: 32
End: 2022-03-14
Payer: COMMERCIAL

## 2022-03-24 ENCOUNTER — HOSPITAL ENCOUNTER (OUTPATIENT)
Dept: PHYSICAL THERAPY | Age: 32
Discharge: HOME | End: 2022-03-24
Payer: COMMERCIAL

## 2022-03-24 PROCEDURE — 97112 NEUROMUSCULAR REEDUCATION: CPT | Performed by: PHYSICAL THERAPIST

## 2022-03-24 NOTE — PROGRESS NOTES
PT DAILY TREATMENT NOTE 2-15    Patient Name: Raymond Zapata  Date:3/24/2022  : 1990  [x]  Patient  Verified  Payor: BLUE CROSS / Plan: [facility] PPO / Product Type: PPO /    In time: 420 AM Out time: 505 PM  Total Treatment Time (min): 45  Total 1:1 Time: 39   Visit #: 15    Treatment Area: Pain in right hip [M25.551]    SUBJECTIVE  Pain Level (0-10 scale): 0/10  Any medication changes, allergies to medications, adverse drug reactions, diagnosis change, or new procedure performed?: [x] No    [] Yes (see summary sheet for update)  Subjective functional status/changes:   [x] No changes reported  Pt reports every other time she squats she does her ZAHRAA exercises   Pt got her standing at work which helps   \"My R hip is still tired\"  \"My back normally does not hurt\"    OBJECTIVE    45 min Neuromuscular Re-education:  [x]  See flow sheet :   Rationale: improve coordination, improve balance and increase proprioception  to improve the patients ability to sit, stand, transfer, ambulate, lift, carry, reach, complete ADLs        With   [] TE   [] TA   [x] Neuro   [] SC   [] other: Patient Education: [x] Review HEP    [] Progressed/Changed HEP based on:   [x] positioning   [x] body mechanics   [] transfers   [x] heat/ice application    [] other:      Other Objective/Functional Measures:   HAdDT - B  Lift test Pre R L1 L L4   Squat L2  HG IR 90  HG flexion 180  HG horiz abd 60 deg      Pain Level (0-10 scale) post treatment: 0/10    ASSESSMENT/Changes in Function:      Patient will continue to benefit from skilled PT services to modify and progress therapeutic interventions, address functional mobility deficits, address ROM deficits, address strength deficits, analyze and address soft tissue restrictions, analyze and cue movement patterns, analyze and modify body mechanics/ergonomics, assess and modify postural abnormalities and instruct in home and community integration to attain remaining goals. []  See Plan of Care  []  See progress note/recertification  []  See Discharge Summary         Progress towards goals / Updated goals:  Difficulty maintaining tuck with several activities this visit. Improved lift test score after today's interventions.     PLAN  [x]  Upgrade activities as tolerated     [x]  Continue plan of care  [x]  Update interventions per flow sheet       []  Discharge due to:_  []  Other:_      Geoffrey Velazquez, PT  DPT  3/24/2022

## 2022-04-13 ENCOUNTER — OFFICE VISIT (OUTPATIENT)
Dept: URGENT CARE | Age: 32
End: 2022-04-13
Payer: COMMERCIAL

## 2022-04-13 VITALS
HEIGHT: 65 IN | TEMPERATURE: 98.4 F | WEIGHT: 151 LBS | OXYGEN SATURATION: 98 % | SYSTOLIC BLOOD PRESSURE: 124 MMHG | BODY MASS INDEX: 25.16 KG/M2 | DIASTOLIC BLOOD PRESSURE: 80 MMHG | RESPIRATION RATE: 16 BRPM | HEART RATE: 64 BPM

## 2022-04-13 DIAGNOSIS — S61.212A LACERATION OF RIGHT MIDDLE FINGER WITHOUT DAMAGE TO NAIL, FOREIGN BODY PRESENCE UNSPECIFIED, INITIAL ENCOUNTER: Primary | ICD-10-CM

## 2022-04-13 PROCEDURE — 12001 RPR S/N/AX/GEN/TRNK 2.5CM/<: CPT | Performed by: FAMILY MEDICINE

## 2022-04-13 RX ORDER — BISMUTH SUBSALICYLATE 262 MG
1 TABLET,CHEWABLE ORAL DAILY
COMMUNITY

## 2022-04-13 NOTE — PATIENT INSTRUCTIONS
Cuts Closed With Adhesives: Care Instructions  Overview  A cut can happen anywhere on your body. The doctor used an adhesive to close the cut. When the adhesive dries, it forms a film that holds the edges of the cut together. Skin adhesives are sometimes called liquid stitches. If the cut went deep and through the skin, the doctor may have put in a layer of stitches below the adhesive. The deeper layer of stitches brings the deep part of the cut together. These stitches will dissolve and don't need to be removed. You don't see the stitches, only the adhesive. You may have a bandage. The doctor has checked you carefully, but problems can develop later. If you notice any problems or new symptoms, get medical treatment right away. Follow-up care is a key part of your treatment and safety. Be sure to make and go to all appointments, and call your doctor if you are having problems. It's also a good idea to know your test results and keep a list of the medicines you take. How can you care for yourself at home? · Keep the cut dry for the first 24 to 48 hours. After this, you can shower if your doctor okays it. Pat the cut dry. · Don't soak the cut, such as in a bathtub. Your doctor will tell you when it's safe to get the cut wet. · If your doctor told you how to care for your cut, follow your doctor's instructions. If you did not get instructions, follow this general advice:  ? Do not put any kind of ointment, cream, or lotion over the area. This can make the adhesive fall off too soon. ? After the first 24 to 48 hours, wash around the cut with clean water 2 times a day. Do not use hydrogen peroxide or alcohol, which can slow healing. ? If the doctor told you to use a bandage, put on a new bandage after cleaning the cut or if the bandage gets wet or dirty. · Prop up the sore area on a pillow anytime you sit or lie down during the next 3 days. Try to keep it above the level of your heart.  This will help reduce swelling. · Leave the skin adhesive on your skin until it falls off on its own. This may take 5 to 10 days. · Do not scratch, rub, or pick at the adhesive. · Do not put the sticky part of a bandage directly on the adhesive. · Avoid any activity that could cause your cut to reopen. · Be safe with medicines. Read and follow all instructions on the label. ? If the doctor gave you a prescription medicine for pain, take it as prescribed. ? If you are not taking a prescription pain medicine, ask your doctor if you can take an over-the-counter medicine. When should you call for help? Call your doctor now or seek immediate medical care if:    · You have new pain, or your pain gets worse.     · The skin near the cut is cold or pale or changes color.     · You have tingling, weakness, or numbness near the cut.     · The cut starts to bleed.     · You have trouble moving the area near the cut.     · You have symptoms of infection, such as:  ? Increased pain, swelling, warmth, or redness around the cut.  ? Red streaks leading from the cut.  ? Pus draining from the cut.  ? A fever. Watch closely for changes in your health, and be sure to contact your doctor if:    · The cut reopens.     · You do not get better as expected. Where can you learn more? Go to http://www.gray.com/  Enter P174 in the search box to learn more about \"Cuts Closed With Adhesives: Care Instructions. \"  Current as of: July 1, 2021               Content Version: 13.2  © 2006-2022 [email or web address]. Care instructions adapted under license by [employer] (which disclaims liability or warranty for this information). If you have questions about a medical condition or this instruction, always ask your healthcare professional. [address] any warranty or liability for your use of this information.

## 2022-04-13 NOTE — PROGRESS NOTES
Hand Laceration  This is a new problem. The current episode started less than 1 hour ago. The problem has not changed since onset. Associated symptoms comments: Cut her rt middle finger with knife, when she was washing dishes. The symptoms are aggravated by bending. Relieved by: compression  She has tried nothing for the symptoms. Past Medical History:   Diagnosis Date    C. difficile colitis     Celiac disease     Kidney stones     Leukemia ([address]     Ovarian torsion     Scoliosis         History reviewed. No pertinent surgical history. History reviewed. No pertinent family history. Social History     Socioeconomic History    Marital status: SINGLE     Spouse name: Not on file    Number of children: Not on file    Years of education: Not on file    Highest education level: Not on file   Occupational History    Not on file   Tobacco Use    Smoking status: Never Smoker    Smokeless tobacco: Never Used   Substance and Sexual Activity    Alcohol use: Yes    Drug use: Not on file    Sexual activity: Not on file   Other Topics Concern    Not on file   Social History Narrative    Not on file     Social Determinants of Health     Financial Resource Strain:     Difficulty of Paying Living Expenses: Not on file   Food Insecurity:     Worried About Running Out of Food in the Last Year: Not on file    Lance of Food in the Last Year: Not on file   Transportation Needs:     Lack of Transportation (Medical): Not on file    Lack of Transportation (Non-Medical):  Not on file   Physical Activity:     Days of Exercise per Week: Not on file    Minutes of Exercise per Session: Not on file   Stress:     Feeling of Stress : Not on file   Social Connections:     Frequency of Communication with Friends and Family: Not on file    Frequency of Social Gatherings with Friends and Family: Not on file    Attends Religion Services: Not on file    Active Member of Clubs or Organizations: Not on file  Attends Club or Organization Meetings: Not on file    Marital Status: Not on file   Intimate Partner Violence:     Fear of Current or Ex-Partner: Not on file    Emotionally Abused: Not on file    Physically Abused: Not on file    Sexually Abused: Not on file   Housing Stability:     Unable to Pay for Housing in the Last Year: Not on file    Number of [place] in the Last Year: Not on file    Unstable Housing in the Last Year: Not on file                ALLERGIES: Patient has no known allergies. Review of Systems   Skin: Positive for wound (rt middle finger ). All other systems reviewed and are negative. Vitals:    04/13/22 0836   BP: 124/80   Pulse: 64   Resp: 16   Temp: 98.4 °F (36.9 °C)   SpO2: 98%   Weight: 151 lb (68.5 kg)   Height: 5' 4.5\" (1.638 m)       Physical Exam  Vitals and nursing note reviewed. Musculoskeletal:      Right hand: Laceration (on distal phalanx of middle finger - superficial ) present. MDM    Wound Closure by Adhesive    Date/Time: 4/13/2022 9:42 AM  Preparation: skin prepped with Shur-Clens  Location details: right long finger  Wound length:2.5 cm or less  Skin closure: glue  Dressing: bendaid. ICD-10-CM ICD-9-CM    1. Laceration of right middle finger without damage to nail, foreign body presence unspecified, initial encounter  S61.212A 883.0        Derma-bond applied     No orders of the defined types were placed in this encounter. No results found for any visits on 04/13/22. The patients condition was discussed with the patient and they understand. The patient is to follow up with primary care doctor. If signs and symptoms become worse the pt is to go to the ER. The patient is to take medications as prescribed. oriented to person, place, time and situation

## 2022-06-08 ENCOUNTER — APPOINTMENT (OUTPATIENT)
Dept: PHYSICAL THERAPY | Age: 32
End: 2022-06-08

## 2022-10-09 ENCOUNTER — OFFICE VISIT (OUTPATIENT)
Dept: URGENT CARE | Age: 32
End: 2022-10-09
Payer: COMMERCIAL

## 2022-10-09 VITALS
SYSTOLIC BLOOD PRESSURE: 117 MMHG | RESPIRATION RATE: 18 BRPM | TEMPERATURE: 98.2 F | WEIGHT: 158 LBS | OXYGEN SATURATION: 99 % | HEIGHT: 65 IN | BODY MASS INDEX: 26.33 KG/M2 | HEART RATE: 79 BPM | DIASTOLIC BLOOD PRESSURE: 79 MMHG

## 2022-10-09 DIAGNOSIS — K64.8 HEMORRHOIDS, INTERNAL, WITH BLEEDING: Primary | ICD-10-CM

## 2022-10-09 PROCEDURE — 99213 OFFICE O/P EST LOW 20 MIN: CPT | Performed by: INTERNAL MEDICINE

## 2022-10-09 RX ORDER — HYDROCORTISONE 25 MG/G
CREAM TOPICAL
Qty: 30 G | Refills: 0 | Status: SHIPPED | OUTPATIENT
Start: 2022-10-09

## 2022-10-09 NOTE — PROGRESS NOTES
HISTORY OF PRESENT ILLNESS  Michelle Gao is a 29 y.o. female. Patient presents with: Other: Pt. Toño Grubbs blood in stool starting yesterday, but in pass 1 week she has seen blood when she wipes aft a bowel movement. The history is provided by the patient. Other  This is a new problem. The current episode started yesterday. The problem occurs constantly. The problem has not changed since onset. Pertinent negatives include no chest pain, no headaches and no shortness of breath. Exacerbated by: bowel movements. Nothing relieves the symptoms. She has tried nothing for the symptoms. There is no problem list on file for this patient. There are no problems to display for this patient. Current Outpatient Medications   Medication Sig Dispense Refill    hydrocortisone (Anusol-HC) 2.5 % rectal cream Insert  into rectum four (4) times daily as needed for Hemorrhoids. 30 g 0    multivitamin (ONE A DAY) tablet Take 1 Tablet by mouth daily. No Known Allergies  Past Medical History:   Diagnosis Date    C. difficile colitis     Celiac disease     Kidney stones     [address] [address]     Ovarian torsion     Scoliosis      History reviewed. No pertinent surgical history. History reviewed. No pertinent family history. Social History     Tobacco Use    Smoking status: Never    Smokeless tobacco: Never   Substance Use Topics    Alcohol use: Yes          Review of Systems   Constitutional:  Negative for chills, fever and malaise/fatigue. HENT:  Negative for congestion and sore throat. Respiratory:  Negative for cough and shortness of breath. Cardiovascular:  Negative for chest pain. Gastrointestinal:  Positive for blood in stool. Negative for diarrhea, nausea and vomiting. Musculoskeletal:  Negative for myalgias. Neurological:  Negative for headaches. All other systems reviewed and are negative. Physical Exam  Vitals and nursing note reviewed.    Constitutional:       General: She is not in acute distress. Appearance: She is well-developed. She is not diaphoretic. HENT:      Head: Normocephalic and atraumatic. Right Ear: External ear normal.      Left Ear: External ear normal.   Cardiovascular:      Rate and Rhythm: Normal rate. Pulmonary:      Effort: Pulmonary effort is normal. No respiratory distress. Abdominal:      General: There is no distension. Genitourinary:     Rectum: Internal hemorrhoid (on left) present. No anal fissure or external hemorrhoid. Neurological:      Mental Status: She is alert and oriented to person, place, and time. Psychiatric:         Behavior: Behavior normal.         Judgment: Judgment normal.       ASSESSMENT and PLAN  CLINICAL IMPRESSION:     ICD-10-CM ICD-9-CM    1. Hemorrhoids, internal, with bleeding  K64.8 455.2             Plan:  F/U with PCP/Specialty in 3-5 days INI with GI or colorectal.  Orders Placed This Encounter    hydrocortisone (Anusol-HC) 2.5 % rectal cream     Sig: Insert  into rectum four (4) times daily as needed for Hemorrhoids. Dispense:  30 g     Refill:  0           The patients condition was discussed with the patient and they understand. The patient is to follow up with PCP. If signs and symptoms become worse the pt is to go to the ER. The patient is to take medications as prescribed.

## 2022-10-12 NOTE — ANCILLARY DISCHARGE INSTRUCTIONS
Physical Therapy at [facility],   a part of  [facility]  [address]  Phone: [phone]  Fax: [phone]    Discharge Summary  2-15    Patient name: Abhay Donato  : 1990  Provider#: [id number]  Referral source: Nubia Kiana*      Medical/Treatment Diagnosis: Pain in right hip [M25.551]     Prior Hospitalization: see medical history     Comorbidities: See Plan of Care  Prior Level of Function:See Plan of Care  Medications: Verified on Patient Summary List    Start of Care: 22      Onset Date:>10 years ago   Visits from Start of Care: 15     Missed Visits: 0  Reporting Period : 22 to 3/24/22      ASSESSMENT/SUMMARY OF CARE: The patient has not been seen since 3/24/22. RECOMMENDATIONS:  [x]Discontinue therapy: []Patient has reached or is progressing toward set goals      []Patient is non-compliant or has abdicated      []Due to lack of appreciable progress towards set goals      [x]Other: significant gap in care.     Eh Hussein, PT 10/12/2022

## 2022-12-27 ENCOUNTER — TELEPHONE (OUTPATIENT)
Dept: NEUROLOGY | Age: 32
End: 2022-12-27

## 2022-12-27 NOTE — TELEPHONE ENCOUNTER
Returned call and scheduled an appt with Dr Abhay Becker. Patient's insurance is changing on 1/1/23 and she is going to send me her new insurance information. Once we get that we will ensure we take it and she will see what her benefits cover and decide if she wants to keep the appt on not.